# Patient Record
Sex: MALE | Race: WHITE | URBAN - METROPOLITAN AREA
[De-identification: names, ages, dates, MRNs, and addresses within clinical notes are randomized per-mention and may not be internally consistent; named-entity substitution may affect disease eponyms.]

---

## 2021-09-22 ENCOUNTER — OFFICE VISIT (OUTPATIENT)
Dept: URBAN - METROPOLITAN AREA CLINIC 32 | Facility: CLINIC | Age: 86
End: 2021-09-22
Payer: MEDICARE

## 2021-09-22 DIAGNOSIS — E11.3212 TYPE 2 DIAB W MILD NONPRLF DIABETIC RTNOP W MACULAR EDEMA, LEFT EYE: ICD-10-CM

## 2021-09-22 DIAGNOSIS — H40.023 OPEN ANGLE WITH BORDERLINE FINDINGS, HIGH RISK, BILATERAL: ICD-10-CM

## 2021-09-22 DIAGNOSIS — H34.8320 TRIBUTARY BRANCH RETINAL VEIN OCCLUSION OF LEFT EYE W/ MACULAR EDEMA: ICD-10-CM

## 2021-09-22 PROCEDURE — 92134 CPTRZ OPH DX IMG PST SGM RTA: CPT | Performed by: OPTOMETRIST

## 2021-09-22 PROCEDURE — 99204 OFFICE O/P NEW MOD 45 MIN: CPT | Performed by: OPTOMETRIST

## 2021-09-22 ASSESSMENT — INTRAOCULAR PRESSURE
OD: 11
OS: 11

## 2021-09-22 NOTE — IMPRESSION/PLAN
Impression: Type 2 diabetes mellitus w/o complication: E32.3. Plan: Diabetes type II: no background retinopathy, no signs of neovascularization noted. Discussed ocular and systemic benefits of blood sugar control. MAC OCT preformed and reviewed with patient.

## 2021-09-22 NOTE — IMPRESSION/PLAN
Impression: Type 2 diab w mild nonprlf diabetic rtnop w macular edema, left eye: E11.3212. Plan: Diabetes type II: mild background diabetic retinopathy. MAC-OCT reviewed with patient.  Recommend Retina Consult with Dr. Ina Pérez in 1-2 weeks

## 2021-09-22 NOTE — IMPRESSION/PLAN
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 Plan: Pt education on condition and the potential of optic nerve damage.  Return to clinic for an RNFL OCT, HVF and IOP check following retina consult

## 2021-09-22 NOTE — IMPRESSION/PLAN
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  Plan: See plan #2

## 2021-10-18 ENCOUNTER — OFFICE VISIT (OUTPATIENT)
Dept: URBAN - METROPOLITAN AREA CLINIC 32 | Facility: CLINIC | Age: 86
End: 2021-10-18
Payer: MEDICARE

## 2021-10-18 DIAGNOSIS — E11.9 TYPE 2 DIABETES MELLITUS W/O COMPLICATION: Primary | ICD-10-CM

## 2021-10-18 DIAGNOSIS — H35.3133 BILATERAL NONEXUDATIVE AGE-RELATED MACULAR DEGENERATION, ADVANCED ATROPHIC W/O SUBFOVEAL INVOLVEMENT: ICD-10-CM

## 2021-10-18 PROCEDURE — 99204 OFFICE O/P NEW MOD 45 MIN: CPT | Performed by: OPHTHALMOLOGY

## 2021-10-18 PROCEDURE — 92134 CPTRZ OPH DX IMG PST SGM RTA: CPT | Performed by: OPHTHALMOLOGY

## 2021-10-18 ASSESSMENT — INTRAOCULAR PRESSURE
OD: 12
OS: 12

## 2021-10-18 NOTE — IMPRESSION/PLAN
Impression: Type 2 diabetes mellitus w/o complication: G80.0. Bilateral. Vision: vision not affected. Condition: stable. Plan: Discussed diagnosis in detail with patient. Exam shows no Diabetic changes. No treatment is recommended at this time. Emphasized blood sugar control and advised to keep future appointments with PCP and/or Endocrinologist for the management of Diabetes. Recommend observation for now. OCT shows no DM changes, drusen.  Return in 2 months with Dr. Berhane Talyor for DE OCT

## 2021-10-18 NOTE — IMPRESSION/PLAN
Impression: Bilateral nonexudative age-related macular degeneration, advanced atrophic w/o subfoveal involvement: H35.3133. Bilateral. Vision: vision not affected. Condition: stable. Plan: Discussed diagnosis in detail with patient. Discussed risks of progression with present condition. Exam shows the macula is stable. No treatment is needed at this time. Recommend observation for now. Continue taking the eye vitamins - AREDS 2 formula. OCT shows no IRF or SRF OU and Drusen.

## 2021-10-27 ENCOUNTER — OFFICE VISIT (OUTPATIENT)
Dept: URBAN - METROPOLITAN AREA CLINIC 32 | Facility: CLINIC | Age: 86
End: 2021-10-27
Payer: MEDICARE

## 2021-10-27 PROCEDURE — 99214 OFFICE O/P EST MOD 30 MIN: CPT | Performed by: OPTOMETRIST

## 2021-10-27 PROCEDURE — 92083 EXTENDED VISUAL FIELD XM: CPT | Performed by: OPTOMETRIST

## 2021-10-27 PROCEDURE — 92133 CPTRZD OPH DX IMG PST SGM ON: CPT | Performed by: OPTOMETRIST

## 2021-10-27 ASSESSMENT — INTRAOCULAR PRESSURE
OS: 11
OD: 10

## 2021-10-27 NOTE — IMPRESSION/PLAN
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  Plan: HVF general depression with a fairly normal OCT, repeat tests in 3 month

## 2021-11-05 ENCOUNTER — OFFICE VISIT (OUTPATIENT)
Dept: URBAN - METROPOLITAN AREA CLINIC 32 | Facility: CLINIC | Age: 86
End: 2021-11-05
Payer: MEDICARE

## 2021-11-05 DIAGNOSIS — H52.4 PRESBYOPIA: ICD-10-CM

## 2021-11-05 PROCEDURE — 99214 OFFICE O/P EST MOD 30 MIN: CPT | Performed by: OPTOMETRIST

## 2021-11-05 ASSESSMENT — KERATOMETRY
OD: 45.25
OS: 42.00

## 2021-11-05 ASSESSMENT — VISUAL ACUITY
OD: 20/40
OS: 20/100